# Patient Record
Sex: FEMALE | Race: WHITE
[De-identification: names, ages, dates, MRNs, and addresses within clinical notes are randomized per-mention and may not be internally consistent; named-entity substitution may affect disease eponyms.]

---

## 2019-08-12 ENCOUNTER — HOSPITAL ENCOUNTER (EMERGENCY)
Dept: HOSPITAL 76 - ED | Age: 24
Discharge: HOME | End: 2019-08-12
Payer: COMMERCIAL

## 2019-08-12 VITALS — DIASTOLIC BLOOD PRESSURE: 78 MMHG | SYSTOLIC BLOOD PRESSURE: 125 MMHG
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
## 2019-08-12 DIAGNOSIS — S93.402A: Primary | ICD-10-CM

## 2019-08-12 PROCEDURE — 99282 EMERGENCY DEPT VISIT SF MDM: CPT

## 2019-08-12 PROCEDURE — 99283 EMERGENCY DEPT VISIT LOW MDM: CPT

## 2019-08-12 NOTE — XRAY REPORT
Reason:  injury

Procedure Date:  08/12/2019   

Accession Number:  [id number] / [id number]                    

Procedure:  XR  - Ankle 3 View LT CPT Code:  

 

FULL RESULT:

 

 

EXAM:

LEFT ANKLE RADIOGRAPHY

 

EXAM DATE: 8/12/2019 06:20 PM.

 

CLINICAL HISTORY: Injury.

 

COMPARISON: None.

 

TECHNIQUE: 3 views.

 

FINDINGS:

 

Bones: No acute fracture. No suspicious osseous lesion.

 

Joints: No dislocation. No significant joint space narrowing.

 

Soft Tissues: Lateral malleolus soft tissue swelling.

IMPRESSION:

No acute osseous abnormality.

 

 

RADIA

## 2019-08-12 NOTE — ED PHYSICIAN DOCUMENTATION
History of Present Illness





- Stated complaint


Stated Complaint: L ANKLE PX





- Chief complaint


Chief Complaint: Ext Problem





- History obtained from


History obtained from: Patient





- History of Present Illness


Timing: Prior to arrival





- Additonal information


Additional information: 





Patient is a previously healthy 24-year-old female who accidentally slipped on 

the stairs and rolled her left ankle and has pain and swelling to the lateral 

malleoli.  No other injuries.  Patient has been able to limp but not fully bear 

weight because of discomfort.  Patient also denies change in strength and range 

of motion because of swelling and pain, but no change in sensation.  No other 

improving or worsening factors noted.





Review of Systems


Musculoskeletal: reports: Extremity pain, Joint pain, Extremity swelling, Joint 

swelling


Neurologic: reports: Focal weakness.  denies: Numbness





PD PAST MEDICAL HISTORY





- Past Medical History


Past Medical History: No





- Past Surgical History


Past Surgical History: No





- Allergies


Allergies/Adverse Reactions: 


                                    Allergies











Allergy/AdvReac Type Severity Reaction Status Date / Time


 


No Known Drug Allergies Allergy   Verified 08/12/19 17:57














PD ED PE NORMAL





- Vitals


Vital signs reviewed: Yes





- General


General: Alert and oriented X 3, No acute distress, Well developed/nourished





- HEENT


HEENT: Atraumatic, Moist mucous membranes





- Neck


Neck: Supple, no meningeal sign





- Cardiac


Cardiac: Strong equal pulses





- Respiratory


Respiratory: No respiratory distress





- Derm


Derm: Normal color, Warm and dry, No rash





- Extremities


Extremities: No deformity.  No: No tenderness to palpate (Moderate tenderness 

over left lateral malleoli and remainder of left lower extremity otherwise 

unremarkable from a bony aspect but significant swelling to left lateral 

malleoli as well with some reduced range of motion because of the swelling and 

pain.  No change in sensation.)





- Neuro


Neuro: No sensory deficit.  No: No motor deficit (see above)





Results





- Vitals


Vitals: 


                               Vital Signs - 24 hr











  08/12/19





  17:55


 


Temperature 37.4 C


 


Heart Rate 107 H


 


Respiratory 20





Rate 


 


Blood Pressure 124/83 H


 


O2 Saturation 97








                                     Oxygen











O2 Source                      Room air

















PD MEDICAL DECISION MAKING





- ED course


Complexity details: reviewed results, considered differential, d/w patient, d/w 

family


ED course: 





Patient presenting with likely sprained ankle.  Plain films obtained which not 

find evidence of dislocation, fracture, or other acute abnormality.  Discussed 

results and recommendations with patient including Ace bandaging and offered 

crutches, which patient agreed to.  Also discussed other supportive cares, 

return precautions, appropriate follow-up.  Patient voiced understanding and is 

comfortable with discharge plan.





Departure





- Departure


Disposition: 01 Home, Self Care


Clinical Impression: 


 Ankle sprain





Condition: Good


Instructions:  ED Sprain Ankle


Follow-Up: 


your,doctor [Other] - Within 3 Days


Comments: 


May use crutches as needed.  Recommend Ace bandaging to help reduce swelling and

 provide stability.  Also recommend elevation, ice application, 

ibuprofen/Tylenol as needed.  Please follow-up with primary care physician in 

next 2 to 3 days and return to ED sooner if experience worsening symptoms or 

have other concerns.

## 2019-09-10 ENCOUNTER — HOSPITAL ENCOUNTER (OUTPATIENT)
Dept: HOSPITAL 76 - DI | Age: 24
Discharge: HOME | End: 2019-09-10
Attending: REGISTERED NURSE
Payer: COMMERCIAL

## 2019-09-10 DIAGNOSIS — S93.412A: ICD-10-CM

## 2019-09-10 DIAGNOSIS — S93.492A: Primary | ICD-10-CM

## 2019-09-10 DIAGNOSIS — M65.872: ICD-10-CM

## 2019-09-11 NOTE — MRI REPORT
Reason:  SPRAIN OF UNSPECIFIED LIGAMENT OF UNSPECIFIED ANKL

Procedure Date:  09/10/2019   

Accession Number:  [id number] / [id number]                    

Procedure:  MRI - Ankle LT W/O CPT Code:  

 

FULL RESULT:

 

 

EXAM:

LEFT ANKLE/HINDFOOT MRI WITHOUT CONTRAST

 

EXAM DATE: 9/10/2019 04:37 PM.

 

CLINICAL HISTORY: Sprain of unspecified ligament of unspecified ankle. 

Lateral ankle pain. Fell down 3 steps.

 

COMPARISON: ANKLE 3 VIEW LT 08/12/2019 6:02 PM.

 

TECHNIQUE: Multiplanar, multisequence T1-weighted and fluid-sensitive 

sequences of the ankle/hindfoot without contrast. Other: None.

 

FINDINGS:

Bones and Articular Surfaces: Large ankle joint effusion. No talar dome 

osteochondral lesion. No acute fracture identified. Trace amount of 

marrow edema at the medial margin of the talus. Ankle mortise appears 

intact.

 

Musculotendinous Structures: The Achilles tendon and plantar fascia 

appear intact. Visualized anterior, posterior and posterior lateral ankle 

tendons appear intact. Small volume of fluid associated with the tibialis 

posterior tendon, more than is typically visualized. Small volume of 

fluid associated with the peroneus longus tendon distal to the lateral 

malleolus. No significant muscle atrophy or fatty replacement.

 

Ligaments: There is thickening and increased T2 signal involving the 

otherwise intact anterior talofibular ligament. The calcaneofibular 

ligament is ill-defined with increased signal but some fibers appear to 

remain intact on the coronal images. Deep and superficial fibers of the 

deltoid ligament appear intact. Normal signal at the tarsal sinus. The 

anterior and posterior distal tibiofibular ligaments appear intact.

 

Miscellaneous: Lateral subcutaneous soft tissue edema.

IMPRESSION:

1. Severe sprains and/or scarring involving the anterior talofibular and 

calcaneofibular ligaments.

2. Mild tibialis posterior tenosynovitis.

3. Minimal peroneus longus tenosynovitis.

 

RADIA

## 2021-05-21 ENCOUNTER — HOSPITAL ENCOUNTER (EMERGENCY)
Dept: HOSPITAL 76 - ED | Age: 26
Discharge: HOME | End: 2021-05-21
Payer: COMMERCIAL

## 2021-05-21 VITALS — DIASTOLIC BLOOD PRESSURE: 77 MMHG | SYSTOLIC BLOOD PRESSURE: 131 MMHG

## 2021-05-21 DIAGNOSIS — J02.9: Primary | ICD-10-CM

## 2021-05-21 PROCEDURE — 87430 STREP A AG IA: CPT

## 2021-05-21 PROCEDURE — 87070 CULTURE OTHR SPECIMN AEROBIC: CPT

## 2021-05-21 PROCEDURE — 99283 EMERGENCY DEPT VISIT LOW MDM: CPT

## 2021-05-21 NOTE — ED PHYSICIAN DOCUMENTATION
PD HPI HEENT





- Stated complaint


Stated Complaint: SORE THROAT





- History obtained from


History obtained from: Patient





- History of Present Illness


Timing - onset: Yesterday


Timing - details: Gradual onset


Pain level now: 3


Location: Throat


Improves: Nothing


Worsens: Swalllowing


Associated symptoms: No: Fever (Tmax 99.0)


Similar symptoms before: Has not had sx before


Recently seen: Not recently seen





Review of Systems


Constitutional: denies: Fever, Chills, Sweats


Ears: denies: Ear pain


Nose: denies: Congestion


Throat: reports: Sore throat


Respiratory: denies: Dyspnea, Cough





PD PAST MEDICAL HISTORY





- Past Medical History


Past Medical History: No





- Past Surgical History


Past Surgical History: No





- Present Medications


Home Medications: 


                                Ambulatory Orders











 Medication  Instructions  Recorded  Confirmed


 


No Known Home Medications  05/21/21 05/21/21














- Allergies


Allergies/Adverse Reactions: 


                                    Allergies











Allergy/AdvReac Type Severity Reaction Status Date / Time


 


No Known Drug Allergies Allergy   Verified 05/21/21 05:16














- Social History


Does the pt smoke?: No


Smoking Status: Never smoker





PD ED PE NORMAL





- Vitals


Vital signs reviewed: Yes





- General


General: Alert and oriented X 3, No acute distress, Well developed/nourished





- HEENT


HEENT: Moist mucous membranes, Other (mild posterior oropharyngeal erythema and 

mild swelling, symmetric and without exudate)





- Neck


Neck: Supple, no meningeal sign





Results





- Vitals


Vitals: 


                               Vital Signs - 24 hr











  05/21/21 05/21/21 05/21/21





  05:14 05:30 06:08


 


Temperature 36.4 C L  


 


Heart Rate 101 H  


 


Respiratory 16 15 15





Rate   


 


Blood Pressure 131/77 H  


 


O2 Saturation 99  








                                     Oxygen











O2 Source                      Room air

















- Labs


Labs: 


                                Laboratory Tests











  05/21/21





  05:30


 


Group A Strep Rapid  Negative














PD MEDICAL DECISION MAKING





- ED course


Complexity details: reviewed results, re-evaluated patient, considered 

differential, d/w patient


ED course: 





c/o sore throat since yesterday with Tmax 99.0. She has no other c/o. She had 

her second dose of COVID vaccine a few days ago; given that her sore throat is 

an isolated c/o, this is likely coincident and not a side effect of the 

vaccination. 


Rapid strep negative. Given 10mg PO decadron prior to d/c.





Departure





- Departure


Disposition: 01 Home, Self Care


Clinical Impression: 


 Pharyngitis





Condition: Good


Instructions:  ED Pharyngitis Viral Report Pending


Discharge Date/Time: 05/21/21 06:09